# Patient Record
Sex: MALE | Race: WHITE | NOT HISPANIC OR LATINO | Employment: OTHER | URBAN - METROPOLITAN AREA
[De-identification: names, ages, dates, MRNs, and addresses within clinical notes are randomized per-mention and may not be internally consistent; named-entity substitution may affect disease eponyms.]

---

## 2018-01-20 ENCOUNTER — ESTABLISHED COMPREHENSIVE EXAM (OUTPATIENT)
Dept: URBAN - METROPOLITAN AREA CLINIC 35 | Facility: CLINIC | Age: 53
End: 2018-01-20

## 2018-01-20 DIAGNOSIS — H52.13: ICD-10-CM

## 2018-01-20 DIAGNOSIS — H52.203: ICD-10-CM

## 2018-01-20 PROCEDURE — 92014 COMPRE OPH EXAM EST PT 1/>: CPT

## 2018-01-20 PROCEDURE — 92015 DETERMINE REFRACTIVE STATE: CPT

## 2018-01-20 ASSESSMENT — KERATOMETRY
OD_K1POWER_DIOPTERS: 41.75
OS_K1POWER_DIOPTERS: 41.00
OS_K2POWER_DIOPTERS: 40.75
OD_K2POWER_DIOPTERS: 41.25

## 2018-01-20 ASSESSMENT — VISUAL ACUITY
OD_CC: J1
OS_CC: J1+
OD_CC: 20/20
OS_CC: 20/20

## 2018-01-20 ASSESSMENT — TONOMETRY
OS_IOP_MMHG: 12
OD_IOP_MMHG: 12

## 2020-01-11 ENCOUNTER — ESTABLISHED COMPREHENSIVE EXAM (OUTPATIENT)
Dept: URBAN - METROPOLITAN AREA CLINIC 35 | Facility: CLINIC | Age: 55
End: 2020-01-11

## 2020-01-11 DIAGNOSIS — H52.13: ICD-10-CM

## 2020-01-11 PROCEDURE — 92014 COMPRE OPH EXAM EST PT 1/>: CPT

## 2020-01-11 PROCEDURE — 92015 DETERMINE REFRACTIVE STATE: CPT

## 2020-01-11 ASSESSMENT — VISUAL ACUITY
OD_SC: J4
OU_CC: 20/20
OS_CC: 20/20
OD_CC: 20/20-1
OD_CC: J1
OS_SC: J8
OS_CC: J1

## 2020-01-11 ASSESSMENT — KERATOMETRY
OS_K1POWER_DIOPTERS: 41.00
OD_K2POWER_DIOPTERS: 41.25
OD_K1POWER_DIOPTERS: 41.75
OS_K2POWER_DIOPTERS: 40.75

## 2020-01-11 ASSESSMENT — TONOMETRY
OD_IOP_MMHG: 12
OS_IOP_MMHG: 11

## 2020-06-08 NOTE — PATIENT DISCUSSION
Continue: dorzolamide-timolol (dorzolamide-timolol): drops: 2-0.5% 1 drop twice a day into both eyes

## 2020-06-08 NOTE — PATIENT DISCUSSION
GLAUCOMA: I have talked with the patient about my impressions, explained our treatment plan, and have answered all questions and patient understands. Patient to continue present medications. Patient to follow up August as planned.  OCT OU then

## 2020-08-11 NOTE — PATIENT DISCUSSION
GLAUCOMA:  I have talked with the patient about my impressions, explained our treatment plan, and have answered all questions and patient understands. Continue present eye drops. Patient to follow up in 6 months.

## 2021-02-19 NOTE — PATIENT DISCUSSION
New Prescription: dorzolamide-timolol (dorzolamide-timolol): drops: 2-0.5% 1 drop twice a day into both eyes 02-

## 2021-03-27 ENCOUNTER — ESTABLISHED COMPREHENSIVE EXAM (OUTPATIENT)
Dept: URBAN - METROPOLITAN AREA CLINIC 35 | Facility: CLINIC | Age: 56
End: 2021-03-27

## 2021-03-27 DIAGNOSIS — H52.13: ICD-10-CM

## 2021-03-27 PROCEDURE — 92015 DETERMINE REFRACTIVE STATE: CPT

## 2021-03-27 PROCEDURE — 92014 COMPRE OPH EXAM EST PT 1/>: CPT

## 2021-03-27 ASSESSMENT — KERATOMETRY
OD_K2POWER_DIOPTERS: 41.25
OS_K1POWER_DIOPTERS: 41.00
OD_K1POWER_DIOPTERS: 41.75
OS_K2POWER_DIOPTERS: 40.75

## 2021-03-27 ASSESSMENT — VISUAL ACUITY
OD_CC: 20/20
OD_CC: J1
OS_CC: J2
OS_CC: 20/20-2

## 2021-03-27 ASSESSMENT — TONOMETRY
OS_IOP_MMHG: 12
OD_IOP_MMHG: 14

## 2021-07-09 NOTE — PATIENT DISCUSSION
Continue: Lotemax (loteprednol etabonate): drops,suspension: 0.5% 1 drop four times a day into right eye

## 2021-07-09 NOTE — PATIENT DISCUSSION
SLT OD: I have explained risks, benefits and alternatives of the selective laser trabeculoplasty surgery to patient. 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 The patient has asked the appropriate questions and has reviewed and signed the informed consent. Patient wishes to proceed with SLT in OD today. Patient will use pred-forte in right eye  QID for 4 days then stop. Patient informed to continue present glaucoma medications . Patient will return to see me in 6-8 weeks. Patient instructed to call the office with any questions or concerns.

## 2021-11-30 NOTE — PATIENT DISCUSSION
Continue: dorzolamide-timolol (dorzolamide-timolol): drops: 2-0.5% 1 drop twice a day into both eyes 02-.

## 2022-08-20 ENCOUNTER — COMPREHENSIVE EXAM (OUTPATIENT)
Dept: URBAN - METROPOLITAN AREA CLINIC 35 | Facility: CLINIC | Age: 57
End: 2022-08-20

## 2022-08-20 DIAGNOSIS — H52.13: ICD-10-CM

## 2022-08-20 PROCEDURE — 92014 COMPRE OPH EXAM EST PT 1/>: CPT

## 2022-08-20 PROCEDURE — 92015 DETERMINE REFRACTIVE STATE: CPT

## 2022-08-20 ASSESSMENT — KERATOMETRY
OD_K1POWER_DIOPTERS: 41.75
OS_K2POWER_DIOPTERS: 40.75
OS_K1POWER_DIOPTERS: 41.00
OD_K2POWER_DIOPTERS: 41.25

## 2022-08-20 ASSESSMENT — VISUAL ACUITY
OD_CC: 20/25-1
OS_SC: 20/25-1
OS_CC: 20/20
OD_CC: J6-
OS_CC: J4-
OD_SC: 20/40-1

## 2022-08-20 ASSESSMENT — TONOMETRY
OS_IOP_MMHG: 14
OD_IOP_MMHG: 14

## 2025-05-29 ENCOUNTER — PREPPED CHART (OUTPATIENT)
Age: 60
End: 2025-05-29